# Patient Record
Sex: FEMALE | Race: AMERICAN INDIAN OR ALASKA NATIVE
[De-identification: names, ages, dates, MRNs, and addresses within clinical notes are randomized per-mention and may not be internally consistent; named-entity substitution may affect disease eponyms.]

---

## 2019-11-08 ENCOUNTER — HOSPITAL ENCOUNTER (EMERGENCY)
Dept: HOSPITAL 5 - ED | Age: 29
LOS: 1 days | Discharge: HOME | End: 2019-11-09
Payer: COMMERCIAL

## 2019-11-08 VITALS — DIASTOLIC BLOOD PRESSURE: 79 MMHG | SYSTOLIC BLOOD PRESSURE: 145 MMHG
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
## 2019-11-08 DIAGNOSIS — Z91.048: ICD-10-CM
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
## 2019-11-08 DIAGNOSIS — Z79.899: ICD-10-CM
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
## 2019-11-08 DIAGNOSIS — Z91.040: ICD-10-CM

## 2019-11-08 DIAGNOSIS — M62.838: ICD-10-CM

## 2019-11-08 DIAGNOSIS — I10: ICD-10-CM

## 2019-11-08 DIAGNOSIS — M62.830: Primary | ICD-10-CM
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
## 2019-11-08 PROCEDURE — 72100 X-RAY EXAM L-S SPINE 2/3 VWS: CPT

## 2019-11-08 PROCEDURE — 72040 X-RAY EXAM NECK SPINE 2-3 VW: CPT
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
## 2021-10-27 ENCOUNTER — HOSPITAL ENCOUNTER (EMERGENCY)
Dept: HOSPITAL 5 - ED | Age: 31
Discharge: HOME | End: 2021-10-27
Payer: COMMERCIAL

## 2021-10-27 VITALS — DIASTOLIC BLOOD PRESSURE: 80 MMHG | SYSTOLIC BLOOD PRESSURE: 121 MMHG

## 2021-10-27 DIAGNOSIS — S16.1XXA: Primary | ICD-10-CM
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
## 2021-10-27 DIAGNOSIS — Z91.040: ICD-10-CM
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
## 2021-10-27 DIAGNOSIS — S46.811A: ICD-10-CM

## 2021-10-27 DIAGNOSIS — Z86.69: ICD-10-CM
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
## 2021-10-27 DIAGNOSIS — Z79.899: ICD-10-CM

## 2021-10-27 DIAGNOSIS — S46.812A: ICD-10-CM

## 2021-10-27 PROCEDURE — 99282 EMERGENCY DEPT VISIT SF MDM: CPT

## 2021-10-27 PROCEDURE — 96372 THER/PROPH/DIAG INJ SC/IM: CPT
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
## 2022-03-25 ENCOUNTER — HOSPITAL ENCOUNTER (EMERGENCY)
Dept: HOSPITAL 5 - ED | Age: 32
Discharge: LEFT BEFORE DISCHARGE | End: 2022-03-25
Payer: COMMERCIAL

## 2022-03-25 DIAGNOSIS — Z53.21: ICD-10-CM

## 2022-03-25 DIAGNOSIS — R51.9: Primary | ICD-10-CM
